# Patient Record
Sex: FEMALE | Race: WHITE | NOT HISPANIC OR LATINO | URBAN - METROPOLITAN AREA
[De-identification: names, ages, dates, MRNs, and addresses within clinical notes are randomized per-mention and may not be internally consistent; named-entity substitution may affect disease eponyms.]

---

## 2023-01-31 DIAGNOSIS — M25.511 RIGHT SHOULDER PAIN, UNSPECIFIED CHRONICITY: Primary | ICD-10-CM

## 2023-02-02 ENCOUNTER — HOSPITAL ENCOUNTER (OUTPATIENT)
Dept: RADIOLOGY | Facility: HOSPITAL | Age: 58
Discharge: HOME | End: 2023-02-02
Attending: ORTHOPAEDIC SURGERY
Payer: COMMERCIAL

## 2023-02-02 ENCOUNTER — OFFICE VISIT (OUTPATIENT)
Dept: ORTHOPEDICS | Facility: CLINIC | Age: 58
End: 2023-02-02
Payer: COMMERCIAL

## 2023-02-02 VITALS
DIASTOLIC BLOOD PRESSURE: 72 MMHG | HEIGHT: 63 IN | BODY MASS INDEX: 26.58 KG/M2 | SYSTOLIC BLOOD PRESSURE: 138 MMHG | WEIGHT: 150 LBS

## 2023-02-02 DIAGNOSIS — M19.011 GLENOHUMERAL ARTHRITIS, RIGHT: ICD-10-CM

## 2023-02-02 DIAGNOSIS — M25.511 RIGHT SHOULDER PAIN, UNSPECIFIED CHRONICITY: ICD-10-CM

## 2023-02-02 DIAGNOSIS — M75.21 BICEPS TENDINITIS OF RIGHT UPPER EXTREMITY: Primary | ICD-10-CM

## 2023-02-02 PROCEDURE — 20550 NJX 1 TENDON SHEATH/LIGAMENT: CPT | Mod: 59,RT,S$GLB, | Performed by: ORTHOPAEDIC SURGERY

## 2023-02-02 PROCEDURE — 20611 LARGE JOINT ASPIRATION/INJECTION: R GLENOHUMERAL: ICD-10-PCS | Mod: RT,S$GLB,, | Performed by: ORTHOPAEDIC SURGERY

## 2023-02-02 PROCEDURE — 73030 X-RAY EXAM OF SHOULDER: CPT | Mod: 26,RT,, | Performed by: RADIOLOGY

## 2023-02-02 PROCEDURE — 99204 OFFICE O/P NEW MOD 45 MIN: CPT | Mod: 25,S$GLB,, | Performed by: ORTHOPAEDIC SURGERY

## 2023-02-02 PROCEDURE — 73030 X-RAY EXAM OF SHOULDER: CPT | Mod: TC,RT

## 2023-02-02 PROCEDURE — 73030 XR SHOULDER COMPLETE 2 OR MORE VIEWS RIGHT: ICD-10-PCS | Mod: 26,RT,, | Performed by: RADIOLOGY

## 2023-02-02 PROCEDURE — 99999 PR PBB SHADOW E&M-EST. PATIENT-LVL III: ICD-10-PCS | Mod: PBBFAC,,, | Performed by: ORTHOPAEDIC SURGERY

## 2023-02-02 PROCEDURE — 20611 DRAIN/INJ JOINT/BURSA W/US: CPT | Mod: RT,S$GLB,, | Performed by: ORTHOPAEDIC SURGERY

## 2023-02-02 PROCEDURE — 99204 PR OFFICE/OUTPT VISIT, NEW, LEVL IV, 45-59 MIN: ICD-10-PCS | Mod: 25,S$GLB,, | Performed by: ORTHOPAEDIC SURGERY

## 2023-02-02 PROCEDURE — 99999 PR PBB SHADOW E&M-EST. PATIENT-LVL III: CPT | Mod: PBBFAC,,, | Performed by: ORTHOPAEDIC SURGERY

## 2023-02-02 PROCEDURE — 20550 TENDON SHEATH: ICD-10-PCS | Mod: 59,RT,S$GLB, | Performed by: ORTHOPAEDIC SURGERY

## 2023-02-02 RX ORDER — NABUMETONE 500 MG/1
500 TABLET, FILM COATED ORAL 2 TIMES DAILY
COMMUNITY
Start: 2022-12-28

## 2023-02-02 RX ORDER — LATANOPROST 50 UG/ML
1 SOLUTION/ DROPS OPHTHALMIC NIGHTLY
COMMUNITY
Start: 2022-12-03

## 2023-02-02 RX ADMIN — METHYLPREDNISOLONE ACETATE 80 MG: 80 INJECTION, SUSPENSION INTRA-ARTICULAR; INTRALESIONAL; INTRAMUSCULAR; SOFT TISSUE at 01:02

## 2023-02-02 RX ADMIN — METHYLPREDNISOLONE ACETATE 40 MG: 80 INJECTION, SUSPENSION INTRA-ARTICULAR; INTRALESIONAL; INTRAMUSCULAR; SOFT TISSUE at 01:02

## 2023-02-02 NOTE — PROGRESS NOTES
Patient ID: Pallavi Hutchison  YOB: 1965  MRN: [id number]    Chief Complaint: Pain of the Right Shoulder      Referred By: Jannette Smiley/ [facility]    History of Present Illness: Pallavi Hutchison is a right handed 57 y.o. female    with a chief complaint of Pain of the Right Shoulder    Pallavi is here today with complaint of right shoulder pain. She rates her pain as a 5/10 on average with tylenol and OTC NSAIDs prn for pain. Her pain began in November 2022 without distinct injury mechanism. She is a  with 3 local teams and says her pain was more noticeable after playing one day in November. She saw Jannette Smiley for dry needling, PT, and strength training. She has no prior hx of any CSI or sx for her shoulder. She reports limited ROM, weakness, and stiffness. She denies any numbness or swelling. Her pain is worsened by trying to reach backwards and raising her arm above shoulder level. She would like to be at 100% for tennis, and has made great progress with physical therapy.     HPI    Past Medical History:   History reviewed. No pertinent past medical history.  Past Surgical History:   Procedure Laterality Date    BACK SURGERY      CHOLECYSTECTOMY      HYSTERECTOMY      TONSILLECTOMY       Family History   Problem Relation Age of Onset    Rheumatologic disease Mother     Cancer Maternal Grandmother      Social History     Socioeconomic History    Marital status:    Tobacco Use    Smoking status: Every Day     Types: Cigarettes    Smokeless tobacco: Never   Substance and Sexual Activity    Alcohol use: Yes    Drug use: Never     Medication List with Changes/Refills   Current Medications    LATANOPROST 0.005 % OPHTHALMIC SOLUTION    Place 1 drop into both eyes every evening.    NABUMETONE (RELAFEN) 500 MG TABLET    Take 500 mg by mouth 2 (two) times daily.     Review of patient's allergies indicates:  No Known Allergies  ROS    Physical Exam:   Body mass  index is 26.57 kg/m².  Vitals:    02/02/23 1326   BP: 138/72      GENERAL: Well appearing, appropriate for stated age, no acute distress.  CARDIOVASCULAR: Pulses regular by peripheral palpation.  PULMONARY: Respirations are even and non-labored.  NEURO: Awake, alert, and oriented x 3.  PSYCH: Mood & affect are appropriate.  HEENT: Head is normocephalic and atraumatic.          Right Shoulder Exam     Tenderness   The patient is tender to palpation of the biceps tendon.    Range of Motion   Active abduction:  160   Forward Elevation: 170  External Rotation 0 degrees:  40   Internal rotation 0 degrees:  Lumbar     Tests & Signs   Cross arm: positive  Drop arm: negative  Impingement: negative  Speed's Test: positive    Other   Sensation: normal    Comments:  Intact extensor pollicis longus, flexor pollicis longus, finger flexion, finger extension, finger abduction and adduction. Sensation intact to radial, median, ulnar, and axillary nerve distributions. Hand warm and well perfused with capillary refill of less than 2 seconds, and palpable distal radial pulses.      Left Shoulder Exam     Range of Motion   Active abduction:  170   Forward Elevation: 180  External Rotation 0 degrees:  50   Internal rotation 0 degrees:  Mid thoracic        Muscle Strength   Right Upper Extremity   Shoulder Abduction: 5/5   Shoulder Internal Rotation: 5/5   Shoulder External Rotation: 5/5   Left Upper Extremity  Shoulder Abduction: 5/5   Shoulder Internal Rotation: 5/5   Shoulder External Rotation: 5/5     Vascular Exam     Right Pulses      Radial:                    2+      Imaging:    X-ray Shoulder 2 or More Views Right  Narrative: EXAM:  XR SHOULDER COMPLETE 2 OR MORE VIEWS RIGHT    INDICATIONS: Right shoulder pain    COMPARISONS: none    TECHNIQUE: 4 views of the right shoulder    FINDINGS:      BONES: No fractures nor dislocations are present.  There are inferior osteophytes at the glenoid fossa and circumferential osteophytes  involving the humeral head.  Inferior acromioclavicular osteophytes are also present.  Both joints appear preserved.    Right upper ribs and right upper lung are normal.        SOFT TISSUES: No abnormal calcifications        MISC: none  Impression:   Degenerative osteoarthritic changes of both the glenohumeral and the acromioclavicular joints as described    Finalized on: 2/2/2023 12:55 PM By:  Santos Multani MD  [facility]# [id number]      2023-02-02 12:57:16.148    [facility]      Relevant imaging results reviewed and interpreted by me, discussed with the patient and / or family today.     Other Tests:         Patient Instructions   Assessment:  Pallavi Hutchison is a 57 y.o. female    with a chief complaint of Pain of the Right Shoulder    Arthritis of glenohumeral joint  Bicep Tendonitis     Encounter Diagnoses   Name Primary?    Glenohumeral arthritis, right     Biceps tendinitis of right upper extremity Yes      Plan:  US guided GH CSI injection 2/2/80  US guided Biceps Tendon 2/2/40  Continue PT with Jannette at [facility]    Follow-up: 6 weeks or sooner if there are any problems between now and then.    Leave Review:   Google: Leave Google Review  Healthgrades: Leave Healthgrades Review    After Hours Number: [phone]      Provider Note/Medical Decision Making:     I, Annette Quintero, acted as a scribe for Pio Peacock MD for the duration of this office visit.    I discussed worrisome and red flag signs and symptoms with the patient. The patient expressed understanding and agreed to alert me immediately or to go to the emergency room if they experience any of these.   Treatment plan was developed with input from the patient/family, and they expressed understanding and agreement with the plan. All questions were answered today.          Pio Peacock MD  Orthopaedic Surgery & Sports Medicine       Disclaimer: This note was prepared using a voice recognition system and is likely to have sound  alike errors within the text.

## 2023-02-02 NOTE — PROGRESS NOTES
Patient ID: Pallavi Hutchison  YOB: 1965  MRN: [id number]    Chief Complaint: Pain of the Right Shoulder      Referred By: Jannette Smiley/ [facility]    History of Present Illness: Pallavi Hutchison is a right handed 57 y.o. female    with a chief complaint of Pain of the Right Shoulder    Pallavi is here today with complaint of right shoulder pain. She rates her pain as a 5/10 on average with tylenol and OTC NSAIDs prn for pain. Her pain began in November 2022 without distinct injury mechanism. She is a  with 3 local teams and says her pain was more noticeable after playing one day. She saw aJnnette Smiley for dry needling, PT, and strength training. She has no prior hx of any CSI or sx for her shoulder. She reports limited ROM, weakness, and stiffness. She denies any numbness or swelling. Her pain is worsened by trying to reach backwards and raising her arm above shoulder level.     HPI    Past Medical History:   History reviewed. No pertinent past medical history.  Past Surgical History:   Procedure Laterality Date    BACK SURGERY      CHOLECYSTECTOMY      HYSTERECTOMY      TONSILLECTOMY       Family History   Problem Relation Age of Onset    Rheumatologic disease Mother     Cancer Maternal Grandmother      Social History     Socioeconomic History    Marital status:    Tobacco Use    Smoking status: Every Day     Types: Cigarettes    Smokeless tobacco: Never   Substance and Sexual Activity    Alcohol use: Yes    Drug use: Never     Medication List with Changes/Refills   Current Medications    LATANOPROST 0.005 % OPHTHALMIC SOLUTION    Place 1 drop into both eyes every evening.    NABUMETONE (RELAFEN) 500 MG TABLET    Take 500 mg by mouth 2 (two) times daily.     Review of patient's allergies indicates:  No Known Allergies  ROS    Physical Exam:   Body mass index is 26.57 kg/m².  There were no vitals filed for this visit.   GENERAL: Well appearing, appropriate  for stated age, no acute distress.  CARDIOVASCULAR: Pulses regular by peripheral palpation.  PULMONARY: Respirations are even and non-labored.  NEURO: Awake, alert, and oriented x 3.  PSYCH: Mood & affect are appropriate.  HEENT: Head is normocephalic and atraumatic.  Ortho/SPM Exam  ***    Imaging:    X-ray Shoulder 2 or More Views Right  Narrative: EXAM:  XR SHOULDER COMPLETE 2 OR MORE VIEWS RIGHT    INDICATIONS: Right shoulder pain    COMPARISONS: none    TECHNIQUE: 4 views of the right shoulder    FINDINGS:      BONES: No fractures nor dislocations are present.  There are inferior osteophytes at the glenoid fossa and circumferential osteophytes involving the humeral head.  Inferior acromioclavicular osteophytes are also present.  Both joints appear preserved.    Right upper ribs and right upper lung are normal.        SOFT TISSUES: No abnormal calcifications        MISC: none  Impression:   Degenerative osteoarthritic changes of both the glenohumeral and the acromioclavicular joints as described    Finalized on: 2/2/2023 12:55 PM By:  Santos Multani MD  [facility]# [id number]      2023-02-02 12:57:16.148    [facility]    ***  Relevant imaging results reviewed and interpreted by me, discussed with the patient and / or family today. ***    Other Tests:     ***    There are no Patient Instructions on file for this visit.  Provider Note/Medical Decision Making: ***      I discussed worrisome and red flag signs and symptoms with the patient. The patient expressed understanding and agreed to alert me immediately or to go to the emergency room if they experience any of these.   Treatment plan was developed with input from the patient/family, and they expressed understanding and agreement with the plan. All questions were answered today.          Pio Peacock MD  Orthopaedic Surgery & Sports Medicine       Disclaimer: This note was prepared using a voice recognition system and is likely to have sound alike errors within the  text.

## 2023-02-02 NOTE — PATIENT INSTRUCTIONS
Assessment:  Pallavi Hutchison is a 57 y.o. female    with a chief complaint of Pain of the Right Shoulder    Arthritis of glenohumeral joint  Bicep Tendonitis     Encounter Diagnoses   Name Primary?    Glenohumeral arthritis, right     Biceps tendinitis of right upper extremity Yes      Plan:  US guided GH CSI injection 2/2/80  US guided Biceps Tendon 2/2/40  Continue PT with Jannette at [facility]    Follow-up: 6 weeks or sooner if there are any problems between now and then.    Leave Review:   Google: Leave Google Review  Healthgrades: Leave Healthgrades Review    After Hours Number: [phone]

## 2023-02-03 NOTE — PROCEDURES
Tendon Sheath    Date/Time: 2/2/2023 1:00 PM  Performed by: Pio Peacock MD  Authorized by: Pio Peacock MD     Consent Done?:  Yes (Verbal)  Indications:  Pain  Site marked: the procedure site was marked    Timeout: prior to procedure the correct patient, procedure, and site was verified    Prep: patient was prepped and draped in usual sterile fashion      Local anesthesia used?: Yes    Anesthesia:  Local infiltration  Local anesthetic:  Lidocaine 1% without epinephrine, topical anesthetic and bupivacaine 0.5% without epinephrine  Anesthetic total (ml):  4    Location:  Shoulder  Site:  R bicep tendon  Ultrasonic guidance for needle placement?: Yes    Needle size:  20 G  Medications:  40 mg methylPREDNISolone acetate 80 mg/mL  Patient tolerance:  Patient tolerated the procedure well with no immediate complications    Additional Comments: Ultrasound Guided Right Shoulder Biceps Tendon Sheath Injection   2cc 1% lidocaine plain, 2cc 0.5% marcaine plain, 0.5cc 80mg methylprednisolone    Procedure Note:  We discussed the risk and benefits of injections, including pain, infection, bleeding, damage to adjacent structures, risk of reaction to injection. We discussed the steroid/cortisone injections will not heal the problem but mat help decrease inflammation and help with symptoms. We discussed the risk of repeated injections. The patient expressed understanding and wanted to proceed with the injection. We performed a timeout to verify the proper patient, proper procedure, and the proper site. The injection site was prepared in a sterile fashion. The patient tolerated it well and there were no complication. We did discuss with the patient that steroid injections can cause some increase in blood sugar and blood pressure for up to a week after the injection.     ULTRASOUND NOTE: Due to the complex nature of the anatomy, and the need to insure the needle was placed precisely at the desired anatomical location,  this procedure was performed with the assistance of ultrasound guidance. Imaging demonstrating needle trajectory and placement was saved on the ultrasound device for documentation purposes.    Large Joint Aspiration/Injection: R glenohumeral    Date/Time: 2/2/2023 1:00 PM  Performed by: Pio Peacock MD  Authorized by: Pio Peacock MD     Consent Done?:  Yes (Verbal)  Indications:  Diagnostic evaluation and pain  Site marked: the procedure site was marked    Timeout: prior to procedure the correct patient, procedure, and site was verified    Prep: patient was prepped and draped in usual sterile fashion      Local anesthesia used?: Yes    Local anesthetic:  Bupivacaine 0.5% without epinephrine and lidocaine 1% without epinephrine    Details:  Needle Size:  20 G  Ultrasonic Guidance for needle placement?: Yes    Images are saved and documented.  Approach:  Posterior  Location:  Shoulder  Site:  R glenohumeral  Medications:  80 mg methylPREDNISolone acetate 80 mg/mL  Patient tolerance:  Patient tolerated the procedure well with no immediate complications     Ultrasound Guided   2cc 1% lidocaine plain, 2cc 0.5% marcaine plain, 1cc 80mg methylprednisolone    Procedure Note:  We discussed the risk and benefits of injections, including pain, infection, bleeding, damage to adjacent structures, risk of reaction to injection. We discussed the steroid/cortisone injections will not heal the problem but mat help decrease inflammation and help with symptoms. We discussed the risk of repeated injections. The patient expressed understanding and wanted to proceed with the injection. We performed a timeout to verify the proper patient, proper procedure, and the proper site. The injection site was prepared in a sterile fashion. The patient tolerated it well and there were no complication. We did discuss with the patient that steroid injections can cause some increase in blood sugar and blood pressure for up to a week after  the injection.     ULTRASOUND NOTE: Due to the complex nature of the anatomy, and the need to insure the needle was placed precisely at the desired anatomical location, this procedure was performed with the assistance of ultrasound guidance. Imaging demonstrating needle trajectory and placement was saved on the ultrasound device for documentation purposes.

## 2023-02-07 RX ORDER — METHYLPREDNISOLONE ACETATE 80 MG/ML
80 INJECTION, SUSPENSION INTRA-ARTICULAR; INTRALESIONAL; INTRAMUSCULAR; SOFT TISSUE
Status: DISCONTINUED | OUTPATIENT
Start: 2023-02-02 | End: 2023-02-07 | Stop reason: HOSPADM

## 2023-02-07 RX ORDER — METHYLPREDNISOLONE ACETATE 80 MG/ML
40 INJECTION, SUSPENSION INTRA-ARTICULAR; INTRALESIONAL; INTRAMUSCULAR; SOFT TISSUE
Status: DISCONTINUED | OUTPATIENT
Start: 2023-02-02 | End: 2023-02-07 | Stop reason: HOSPADM

## 2023-03-15 ENCOUNTER — OFFICE VISIT (OUTPATIENT)
Dept: ORTHOPEDICS | Facility: CLINIC | Age: 58
End: 2023-03-15
Payer: COMMERCIAL

## 2023-03-15 DIAGNOSIS — M75.21 BICEPS TENDINITIS OF RIGHT UPPER EXTREMITY: ICD-10-CM

## 2023-03-15 DIAGNOSIS — M19.011 GLENOHUMERAL ARTHRITIS, RIGHT: ICD-10-CM

## 2023-03-15 DIAGNOSIS — G89.29 CHRONIC RIGHT SHOULDER PAIN: Primary | ICD-10-CM

## 2023-03-15 DIAGNOSIS — M25.511 CHRONIC RIGHT SHOULDER PAIN: Primary | ICD-10-CM

## 2023-03-15 PROCEDURE — 99214 PR OFFICE/OUTPT VISIT, EST, LEVL IV, 30-39 MIN: ICD-10-PCS | Mod: S$GLB,,, | Performed by: ORTHOPAEDIC SURGERY

## 2023-03-15 PROCEDURE — 99214 OFFICE O/P EST MOD 30 MIN: CPT | Mod: S$GLB,,, | Performed by: ORTHOPAEDIC SURGERY

## 2023-03-15 PROCEDURE — 99999 PR PBB SHADOW E&M-EST. PATIENT-LVL III: CPT | Mod: PBBFAC,,, | Performed by: ORTHOPAEDIC SURGERY

## 2023-03-15 PROCEDURE — 99999 PR PBB SHADOW E&M-EST. PATIENT-LVL III: ICD-10-PCS | Mod: PBBFAC,,, | Performed by: ORTHOPAEDIC SURGERY

## 2023-03-15 NOTE — PROGRESS NOTES
Patient ID: Pallavi Hutchison  YOB: 1965  MRN: [id number]    Chief Complaint: Follow-up (US GH CSI and biceps tendon CSI)      Referred By: Jannette Smiley / [facility]    History of Present Illness: Pallavi Hutchison is a right handed 57 y.o. female    with a chief complaint of Follow-up (US GH CSI and biceps tendon CSI)    Patient presents to follow up visit for right shoulder pain s/p US GH CSI and biceps tendon CSI. She states that she experienced relief immediately following the injection. She is no longer experiencing relief. She describes her pain as intermittent, sharp pain that radiates down her arm. She occasionally has pain into her neck. Her pain is a 0/10 at rest but increases to 7-8/10 with activity.    Previous HPI (2/2/23):  Pallavi is here today with complaint of right shoulder pain. She rates her pain as a 5/10 on average with tylenol and OTC NSAIDs prn for pain. Her pain began in November 2022 without distinct injury mechanism. She is a  with 3 local teams and says her pain was more noticeable after playing one day in November. She saw Jannette Smiley for dry needling, PT, and strength training. She has no prior hx of any CSI or sx for her shoulder. She reports limited ROM, weakness, and stiffness. She denies any numbness or swelling. Her pain is worsened by trying to reach backwards and raising her arm above shoulder level. She would like to be at 100% for tennis, and has made great progress with physical therapy.     HPI    Past Medical History:   History reviewed. No pertinent past medical history.  Past Surgical History:   Procedure Laterality Date    BACK SURGERY      CHOLECYSTECTOMY      HYSTERECTOMY      TONSILLECTOMY       Family History   Problem Relation Age of Onset    Rheumatologic disease Mother     Cancer Maternal Grandmother      Social History     Socioeconomic History    Marital status:    Tobacco Use    Smoking status: Every Day      Types: Cigarettes    Smokeless tobacco: Never   Substance and Sexual Activity    Alcohol use: Yes    Drug use: Never     Medication List with Changes/Refills   Current Medications    LATANOPROST 0.005 % OPHTHALMIC SOLUTION    Place 1 drop into both eyes every evening.    NABUMETONE (RELAFEN) 500 MG TABLET    Take 500 mg by mouth 2 (two) times daily.     Review of patient's allergies indicates:  No Known Allergies  ROS    Physical Exam:   There is no height or weight on file to calculate BMI.  There were no vitals filed for this visit.   GENERAL: Well appearing, appropriate for stated age, no acute distress.  CARDIOVASCULAR: Pulses regular by peripheral palpation.  PULMONARY: Respirations are even and non-labored.  NEURO: Awake, alert, and oriented x 3.  PSYCH: Mood & affect are appropriate.  HEENT: Head is normocephalic and atraumatic.          Right Shoulder Exam     Tenderness   The patient is tender to palpation of the biceps tendon.    Tests & Signs   Goff test: positive  Speed's Test: positive    Other   Sensation: normal    Muscle Strength   Right Upper Extremity   Shoulder Abduction: 5/5   Shoulder Internal Rotation: 5/5   Shoulder External Rotation: 5/5     Vascular Exam     Right Pulses      Radial:                    2+      Imaging:    X-ray Shoulder 2 or More Views Right  Narrative: EXAM:  XR SHOULDER COMPLETE 2 OR MORE VIEWS RIGHT    INDICATIONS: Right shoulder pain    COMPARISONS: none    TECHNIQUE: 4 views of the right shoulder    FINDINGS:      BONES: No fractures nor dislocations are present.  There are inferior osteophytes at the glenoid fossa and circumferential osteophytes involving the humeral head.  Inferior acromioclavicular osteophytes are also present.  Both joints appear preserved.    Right upper ribs and right upper lung are normal.        SOFT TISSUES: No abnormal calcifications        MISC: none  Impression:   Degenerative osteoarthritic changes of both the glenohumeral and the  acromioclavicular joints as described    Finalized on: 2/2/2023 12:55 PM By:  Santos Multani MD  [facility]# [id number]      2023-02-02 12:57:16.148    [facility]      Relevant imaging results reviewed and interpreted by me, discussed with the patient and / or family today.     Other Tests:         Patient Instructions   Assessment:  Pallavi Hutchison is a RHD 57 y.o. female    with a chief complaint of Follow-up (US GH CSI and biceps tendon CSI)    Right shoulder glenohumeral arthritis   Right shoulder bicep tendonitis  Minimal to no improvement after CSI and physical therapy     Encounter Diagnoses   Name Primary?    Chronic right shoulder pain Yes    Glenohumeral arthritis, right     Biceps tendinitis of right upper extremity       Plan:  Continue at home exercise provided by Jannette guevara [facility]  MRI of right shoulder   Discussed possible nerve burning procedure with Dr. Mckeon in [facility]    Follow-up: AFTER MRI or sooner if there are any problems between now and then.    Leave Review:   Google: Leave Google Review  Healthgrades: Leave Healthgrades Review    After Hours Number: [phone]       Provider Note/Medical Decision Making:       I discussed worrisome and red flag signs and symptoms with the patient. The patient expressed understanding and agreed to alert me immediately or to go to the emergency room if they experience any of these.   Treatment plan was developed with input from the patient/family, and they expressed understanding and agreement with the plan. All questions were answered today.          Pio Peacock MD  Orthopaedic Surgery & Sports Medicine       Disclaimer: This note was prepared using a voice recognition system and is likely to have sound alike errors within the text.

## 2023-03-15 NOTE — PROGRESS NOTES
Patient ID: Pallavi Hutchison  YOB: 1965  MRN: [id number]    Chief Complaint: No chief complaint on file.      Referred By: Jannette Smiley / [facility]    History of Present Illness: Pallavi Hutchison is a right handed 57 y.o. female    with a chief complaint of No chief complaint on file.    Patient presents to follow up visit for right shoulder pain s/p US GH CSI and biceps tendon CSI. She states that she experienced relief immediately following the injection. She is no longer experiencing relief. She describes her pain as intermittent, sharp pain that radiates down her arm. She occasionally has pain into her neck. Her pain is a 0/10 at rest but increases to 7-8/10 with activity.    Previous HPI (2/2/23):  Pallavi is here today with complaint of right shoulder pain. She rates her pain as a 5/10 on average with tylenol and OTC NSAIDs prn for pain. Her pain began in November 2022 without distinct injury mechanism. She is a  with 3 local teams and says her pain was more noticeable after playing one day in November. She saw Jannette Smiley for dry needling, PT, and strength training. She has no prior hx of any CSI or sx for her shoulder. She reports limited ROM, weakness, and stiffness. She denies any numbness or swelling. Her pain is worsened by trying to reach backwards and raising her arm above shoulder level. She would like to be at 100% for tennis, and has made great progress with physical therapy.     HPI    Past Medical History:   No past medical history on file.  Past Surgical History:   Procedure Laterality Date    BACK SURGERY      CHOLECYSTECTOMY      HYSTERECTOMY      TONSILLECTOMY       Family History   Problem Relation Age of Onset    Rheumatologic disease Mother     Cancer Maternal Grandmother      Social History     Socioeconomic History    Marital status:    Tobacco Use    Smoking status: Every Day     Types: Cigarettes    Smokeless tobacco: Never    Substance and Sexual Activity    Alcohol use: Yes    Drug use: Never     Medication List with Changes/Refills   Current Medications    LATANOPROST 0.005 % OPHTHALMIC SOLUTION    Place 1 drop into both eyes every evening.    NABUMETONE (RELAFEN) 500 MG TABLET    Take 500 mg by mouth 2 (two) times daily.     Review of patient's allergies indicates:  No Known Allergies  ROS    Physical Exam:   There is no height or weight on file to calculate BMI.  There were no vitals filed for this visit.   GENERAL: Well appearing, appropriate for stated age, no acute distress.  CARDIOVASCULAR: Pulses regular by peripheral palpation.  PULMONARY: Respirations are even and non-labored.  NEURO: Awake, alert, and oriented x 3.  PSYCH: Mood & affect are appropriate.  HEENT: Head is normocephalic and atraumatic.  Ortho/SPM Exam  ***    Imaging:    X-ray Shoulder 2 or More Views Right  Narrative: EXAM:  XR SHOULDER COMPLETE 2 OR MORE VIEWS RIGHT    INDICATIONS: Right shoulder pain    COMPARISONS: none    TECHNIQUE: 4 views of the right shoulder    FINDINGS:      BONES: No fractures nor dislocations are present.  There are inferior osteophytes at the glenoid fossa and circumferential osteophytes involving the humeral head.  Inferior acromioclavicular osteophytes are also present.  Both joints appear preserved.    Right upper ribs and right upper lung are normal.        SOFT TISSUES: No abnormal calcifications        MISC: none  Impression:   Degenerative osteoarthritic changes of both the glenohumeral and the acromioclavicular joints as described    Finalized on: 2/2/2023 12:55 PM By:  Santos Multani MD  [facility]# [id number]      2023-02-02 12:57:16.148    [facility]    ***  Relevant imaging results reviewed and interpreted by me, discussed with the patient and / or family today. ***    Other Tests:     ***    There are no Patient Instructions on file for this visit.  Provider Note/Medical Decision Making: ***      I discussed worrisome and red flag  signs and symptoms with the patient. The patient expressed understanding and agreed to alert me immediately or to go to the emergency room if they experience any of these.   Treatment plan was developed with input from the patient/family, and they expressed understanding and agreement with the plan. All questions were answered today.          Pio Peacock MD  Orthopaedic Surgery & Sports Medicine       Disclaimer: This note was prepared using a voice recognition system and is likely to have sound alike errors within the text.

## 2023-03-15 NOTE — PATIENT INSTRUCTIONS
Assessment:  Pallavi Hutchison is a RHD 57 y.o. female    with a chief complaint of Follow-up (US GH CSI and biceps tendon CSI)    Right shoulder glenohumeral arthritis   Right shoulder bicep tendonitis  Minimal to no improvement after CSI and physical therapy     Encounter Diagnoses   Name Primary?    Chronic right shoulder pain Yes    Glenohumeral arthritis, right     Biceps tendinitis of right upper extremity       Plan:  Continue at home exercise provided by Jannette at [facility]  MRI of right shoulder   Discussed possible nerve burning procedure with Dr. Mckeon in IPM    Follow-up: AFTER MRI or sooner if there are any problems between now and then.    Leave Review:   Google: Leave Google Review  Healthgrades: Leave Healthgrades Review    After Hours Number: [phone]

## 2023-03-22 ENCOUNTER — TELEPHONE (OUTPATIENT)
Dept: ORTHOPEDICS | Facility: CLINIC | Age: 58
End: 2023-03-22
Payer: COMMERCIAL

## 2023-03-23 ENCOUNTER — HOSPITAL ENCOUNTER (OUTPATIENT)
Dept: RADIOLOGY | Facility: HOSPITAL | Age: 58
Discharge: HOME | End: 2023-03-23
Attending: ORTHOPAEDIC SURGERY
Payer: COMMERCIAL

## 2023-03-23 DIAGNOSIS — G89.29 CHRONIC RIGHT SHOULDER PAIN: ICD-10-CM

## 2023-03-23 DIAGNOSIS — M25.511 CHRONIC RIGHT SHOULDER PAIN: ICD-10-CM

## 2023-03-23 PROCEDURE — 73221 MRI SHOULDER WITHOUT CONTRAST RIGHT: ICD-10-PCS | Mod: 26,RT,, | Performed by: STUDENT IN AN ORGANIZED HEALTH CARE EDUCATION/TRAINING PROGRAM

## 2023-03-23 PROCEDURE — 73221 MRI JOINT UPR EXTREM W/O DYE: CPT | Mod: 26,RT,, | Performed by: STUDENT IN AN ORGANIZED HEALTH CARE EDUCATION/TRAINING PROGRAM

## 2023-03-23 PROCEDURE — 73221 MRI JOINT UPR EXTREM W/O DYE: CPT | Mod: TC,PN,RT

## 2023-03-24 ENCOUNTER — OFFICE VISIT (OUTPATIENT)
Dept: ORTHOPEDICS | Facility: CLINIC | Age: 58
End: 2023-03-24
Payer: COMMERCIAL

## 2023-03-24 DIAGNOSIS — M19.011 GLENOHUMERAL ARTHRITIS, RIGHT: ICD-10-CM

## 2023-03-24 DIAGNOSIS — M75.81 TENDINITIS OF RIGHT ROTATOR CUFF: Primary | ICD-10-CM

## 2023-03-24 DIAGNOSIS — M19.011 ARTHRITIS OF RIGHT ACROMIOCLAVICULAR JOINT: ICD-10-CM

## 2023-03-24 PROCEDURE — 99999 PR PBB SHADOW E&M-EST. PATIENT-LVL III: ICD-10-PCS | Mod: PBBFAC,,, | Performed by: ORTHOPAEDIC SURGERY

## 2023-03-24 PROCEDURE — 99214 OFFICE O/P EST MOD 30 MIN: CPT | Mod: S$GLB,,, | Performed by: ORTHOPAEDIC SURGERY

## 2023-03-24 PROCEDURE — 99999 PR PBB SHADOW E&M-EST. PATIENT-LVL III: CPT | Mod: PBBFAC,,, | Performed by: ORTHOPAEDIC SURGERY

## 2023-03-24 PROCEDURE — 99214 PR OFFICE/OUTPT VISIT, EST, LEVL IV, 30-39 MIN: ICD-10-PCS | Mod: S$GLB,,, | Performed by: ORTHOPAEDIC SURGERY

## 2023-03-24 NOTE — PATIENT INSTRUCTIONS
Assessment:  Pallavi VELEZ Kianna is a RHD 57 y.o. female    with a chief complaint of Pain of the Right Shoulder    R shoulder OA  R shoulder rotator cuff tendonitis    Encounter Diagnoses   Name Primary?    Tendinitis of right rotator cuff Yes    Glenohumeral arthritis, right     Arthritis of right acromioclavicular joint       Plan:  Start PT- [facility] or at the [facility] with Marcellus    Follow-up: 6 weeks or sooner if there are any problems between now and then.    Leave Review:   Google: Leave Google Review  Healthgrades: Leave Healthgrades Review    After Hours Number: [phone]

## 2023-03-24 NOTE — PROGRESS NOTES
Patient ID: Pallavi Hutchison  YOB: 1965  MRN: [id number]    Chief Complaint: Pain of the Right Shoulder      Referred By: current patient    History of Present Illness: Pallavi Hutchison is a RHD 57 y.o. female    with a chief complaint of Pain of the Right Shoulder    Pallavi is here today for her right shoulder MRI review. She rates her pain as a 7/10 today. She is still doing her home exercise program.     HPI    Past Medical History:   No past medical history on file.  Past Surgical History:   Procedure Laterality Date    BACK SURGERY      CHOLECYSTECTOMY      HYSTERECTOMY      TONSILLECTOMY       Family History   Problem Relation Age of Onset    Rheumatologic disease Mother     Cancer Maternal Grandmother      Social History     Socioeconomic History    Marital status: Single   Tobacco Use    Smoking status: Every Day     Types: Cigarettes    Smokeless tobacco: Never   Substance and Sexual Activity    Alcohol use: Yes    Drug use: Never     Medication List with Changes/Refills   Current Medications    LATANOPROST 0.005 % OPHTHALMIC SOLUTION    Place 1 drop into both eyes every evening.    NABUMETONE (RELAFEN) 500 MG TABLET    Take 500 mg by mouth 2 (two) times daily.     Review of patient's allergies indicates:  No Known Allergies  ROS    Physical Exam:   There is no height or weight on file to calculate BMI.  There were no vitals filed for this visit.   GENERAL: Well appearing, appropriate for stated age, no acute distress.  CARDIOVASCULAR: Pulses regular by peripheral palpation.  PULMONARY: Respirations are even and non-labored.  NEURO: Awake, alert, and oriented x 3.  PSYCH: Mood & affect are appropriate.  HEENT: Head is normocephalic and atraumatic.  Ortho/SPM Exam  Right shoulder: no changes    Imaging:    MRI Shoulder Without Contrast Right  Narrative: EXAMINATION:  MRI SHOULDER WITHOUT CONTRAST RIGHT    CLINICAL HISTORY:  Shoulder pain, chronic, osteoarthritis suspected;   Pain in right shoulder    TECHNIQUE:  Multiplanar, multisequence imaging of the right shoulder without the use of intravenous contrast.    COMPARISON:  Right shoulder radiographs 02/02/2023.    FINDINGS:  Rotator cuff:  Moderate rotator cuff tendinosis without a full-thickness or significant partial-thickness tear.  Relative sparing of the teres minor tendon.  Moderate interstitial tearing along the infraspinatus musculotendinous junction.  Small focal interstitial tear is at the anterior supraspinatus and posterior infraspinatus insertions.  Muscle bulk is preserved.    Labrum: Diffuse degenerative labral fraying with possible degenerative tearing along the posterior and inferior labrum, approximately 180°.  No definite paralabral cyst.    Biceps: Mild intra-articular biceps tendinosis.  Mild tenosynovitis.    Bone: No acute fracture or suspicious osseous lesion.    Glenohumeral joint: Anatomical joint alignment.  Moderate/severe cartilage thinning and irregularity with near complete cartilage loss at the superior/medial humeral head and mild associated subchondral edema.  Mild inferior humeral head marginal osteophytosis.  Small effusion with moderate synovitis.  No visualized intra-articular body.    AC joint: Moderate AC joint degenerative arthrosis with marginal osteophytosis contributes to moderate encroachment on the rotator cuff.  Type 2 acromial arch with lateral downsloping.  No os acromial.    Miscellaneous: Mild subacromial/subdeltoid bursitis.  Impression: Moderate rotator cuff tendinosis without a full-thickness or significant partial-thickness tear.  Mild/moderate interstitial tearing, as above.    Mild subacromial/subdeltoid bursitis.  Mild intra-articular biceps tendinosis with tenosynovitis.    Diffuse degenerative labral fraying with possible 180 degree degenerative tear involving the posterior and inferior labrum.    Moderate/severe glenohumeral chondrosis with mild/moderate osseous degenerative  changes.  Small effusion with moderate synovitis.  No intra-articular body.    Moderate AC joint degenerative arthrosis contributes to moderate encroachment on the rotator cuff.    Electronically signed by: Angel Luis Silva  Date:    03/23/2023  Time:    12:53      Relevant imaging results reviewed and interpreted by me, discussed with the patient and / or family today.     Other Tests:         Patient Instructions   Assessment:  Pallavi Hutchison is a RHD 57 y.o. female    with a chief complaint of Pain of the Right Shoulder    R shoulder OA  R shoulder rotator cuff tendonitis    Encounter Diagnoses   Name Primary?    Tendinitis of right rotator cuff Yes    Glenohumeral arthritis, right     Arthritis of right acromioclavicular joint       Plan:  Start PT- [facility] or at the [facility] with Marcellus    Follow-up: 6 weeks or sooner if there are any problems between now and then.    Leave Review:   Google: Leave Google Review  Healthgrades: Leave Healthgrades Review    After Hours Number: [phone]      Provider Note/Medical Decision Making:       I discussed worrisome and red flag signs and symptoms with the patient. The patient expressed understanding and agreed to alert me immediately or to go to the emergency room if they experience any of these.   Treatment plan was developed with input from the patient/family, and they expressed understanding and agreement with the plan. All questions were answered today.          Pio Peacock MD  Orthopaedic Surgery & Sports Medicine       Disclaimer: This note was prepared using a voice recognition system and is likely to have sound alike errors within the text.

## 2023-05-01 ENCOUNTER — PATIENT MESSAGE (OUTPATIENT)
Dept: SPORTS MEDICINE | Facility: CLINIC | Age: 58
End: 2023-05-01
Payer: COMMERCIAL

## 2023-05-03 ENCOUNTER — TELEPHONE (OUTPATIENT)
Dept: SPORTS MEDICINE | Facility: CLINIC | Age: 58
End: 2023-05-03
Payer: COMMERCIAL

## 2023-05-03 NOTE — TELEPHONE ENCOUNTER
Spoke with patient about rescheduling appointment on 5/5/23.  Patient stated that she would like to cancel the appointment because she has shown great improvement at PT with [facility].  Told her to reach out to us if she needs anything.  She was grateful for the call.

## 2024-07-30 ENCOUNTER — HOSPITAL ENCOUNTER (OUTPATIENT)
Dept: PREADMISSION TESTING | Facility: HOSPITAL | Age: 59
Discharge: HOME | End: 2024-07-30
Attending: OBSTETRICS & GYNECOLOGY
Payer: COMMERCIAL

## 2024-07-30 DIAGNOSIS — Z12.11 SCREEN FOR COLON CANCER: Primary | ICD-10-CM

## 2024-07-30 RX ORDER — SODIUM, POTASSIUM,MAG SULFATES 17.5-3.13G
1 SOLUTION, RECONSTITUTED, ORAL ORAL DAILY
Qty: 1 KIT | Refills: 0 | Status: SHIPPED | OUTPATIENT
Start: 2024-07-30 | End: 2024-08-01

## 2024-11-29 ENCOUNTER — TELEPHONE (OUTPATIENT)
Dept: ORTHOPEDICS | Facility: CLINIC | Age: 59
End: 2024-11-29
Payer: COMMERCIAL

## 2024-11-29 DIAGNOSIS — S83.242D ACUTE MEDIAL MENISCUS TEAR OF LEFT KNEE, SUBSEQUENT ENCOUNTER: Primary | ICD-10-CM

## 2024-11-29 RX ORDER — NABUMETONE 500 MG/1
500 TABLET, FILM COATED ORAL 2 TIMES DAILY
Qty: 60 TABLET | Refills: 2 | Status: SHIPPED | OUTPATIENT
Start: 2024-11-29

## 2024-11-29 NOTE — TELEPHONE ENCOUNTER
----- Message from Kyung sent at 11/27/2024  1:43 PM CST -----  Contact: c  Type:  RX Refill Request    Who Called: amy  Refill or New Rx:refill  RX Name and Strength:nabumetone   How is the patient currently taking it? (ex. 1XDay):unknown  Is this a 30 day or 90 day RX:  Local or Mail Order:local  Ordering Provider:constantine tello  Would the patient rather a call back or a response via [facility]? Call back   Best Call Back Number: [phone]  Additional Information:   [id number] - [place] - [address]  [address] [address]  Phone: [phone] Fax: [phone]

## 2025-03-14 ENCOUNTER — TELEPHONE (OUTPATIENT)
Dept: ORTHOPEDICS | Facility: CLINIC | Age: 60
End: 2025-03-14
Payer: COMMERCIAL

## 2025-03-14 DIAGNOSIS — S83.242D ACUTE MEDIAL MENISCUS TEAR OF LEFT KNEE, SUBSEQUENT ENCOUNTER: ICD-10-CM

## 2025-03-14 RX ORDER — NABUMETONE 500 MG/1
500 TABLET, FILM COATED ORAL 2 TIMES DAILY PRN
Qty: 60 TABLET | Refills: 0 | Status: SHIPPED | OUTPATIENT
Start: 2025-03-14

## 2025-03-14 NOTE — TELEPHONE ENCOUNTER
----- Message from Marcellus Andino PA-C sent at 3/14/2025 12:30 PM CDT -----  May provide one month refill. Patient needs to be seen for further refills.  ----- Message -----  From: Shelby Guzman MA  Sent: 3/14/2025  12:21 PM CDT  To: Marcellus Andino PA-C    Requesting refill of Nabumetone  ----- Message -----  From: Samantha Myers  Sent: 3/14/2025  11:38 AM CDT  To: Pope Kimani Staff    Type:  RX Refill RequestWho Called:  pt Refill or New Rx:  refill RX Name and Strength:  nabumetone (RELAFEN) 500 MG tabletHow is the patient currently taking it? (ex. 1XDay):  as directIs this a 30 day or 90 day RX:  90Preferred Pharmacy with phone number:  [id number] - [place], [place] - [address] [facility] [id number] [facility] [address]Gjavc: [phone] Fax: [phone] or Mail Order:  local Ordering Provider:  pope Flaquito Call Back Number:  [phone] (home) [phone] (work)Additional Information:  please advise

## 2025-03-14 NOTE — TELEPHONE ENCOUNTER
Per Marcellus he will approve 1 refill spoke with patient about coming in for an office visit for additional refills. Patient does not understand why she should have to come in for refills. I will ask Marcellus and call her back.

## 2025-03-21 ENCOUNTER — HOSPITAL ENCOUNTER (OUTPATIENT)
Dept: RADIOLOGY | Facility: HOSPITAL | Age: 60
Discharge: HOME | End: 2025-03-21
Attending: ORTHOPAEDIC SURGERY
Payer: COMMERCIAL

## 2025-03-21 ENCOUNTER — HOSPITAL ENCOUNTER (OUTPATIENT)
Dept: RADIOLOGY | Facility: HOSPITAL | Age: 60
Discharge: HOME | End: 2025-03-21
Attending: PHYSICIAN ASSISTANT
Payer: COMMERCIAL

## 2025-03-21 ENCOUNTER — OFFICE VISIT (OUTPATIENT)
Dept: ORTHOPEDICS | Facility: CLINIC | Age: 60
End: 2025-03-21
Payer: COMMERCIAL

## 2025-03-21 ENCOUNTER — PATIENT MESSAGE (OUTPATIENT)
Dept: ORTHOPEDICS | Facility: CLINIC | Age: 60
End: 2025-03-21

## 2025-03-21 ENCOUNTER — OFFICE VISIT (OUTPATIENT)
Dept: SPORTS MEDICINE | Facility: CLINIC | Age: 60
End: 2025-03-21
Payer: COMMERCIAL

## 2025-03-21 VITALS
HEART RATE: 61 BPM | DIASTOLIC BLOOD PRESSURE: 73 MMHG | BODY MASS INDEX: 23.9 KG/M2 | SYSTOLIC BLOOD PRESSURE: 110 MMHG | WEIGHT: 140 LBS | HEIGHT: 64 IN

## 2025-03-21 VITALS — WEIGHT: 140 LBS | BODY MASS INDEX: 24.03 KG/M2

## 2025-03-21 DIAGNOSIS — M25.561 RIGHT KNEE PAIN, UNSPECIFIED CHRONICITY: ICD-10-CM

## 2025-03-21 DIAGNOSIS — M25.561 ACUTE PAIN OF RIGHT KNEE: ICD-10-CM

## 2025-03-21 DIAGNOSIS — M25.561 ACUTE PAIN OF RIGHT KNEE: Primary | ICD-10-CM

## 2025-03-21 DIAGNOSIS — M25.561 RIGHT KNEE PAIN, UNSPECIFIED CHRONICITY: Primary | ICD-10-CM

## 2025-03-21 DIAGNOSIS — S83.281A ACUTE LATERAL MENISCUS TEAR OF RIGHT KNEE, INITIAL ENCOUNTER: ICD-10-CM

## 2025-03-21 DIAGNOSIS — M17.11 PRIMARY OSTEOARTHRITIS OF RIGHT KNEE: Primary | ICD-10-CM

## 2025-03-21 DIAGNOSIS — Z53.9 PERSONS ENCOUNTERING HEALTH SERVICES FOR SPECIFIC PROCEDURES, NOT CARRIED OUT: Primary | ICD-10-CM

## 2025-03-21 DIAGNOSIS — M17.12 PRIMARY OSTEOARTHRITIS OF LEFT KNEE: ICD-10-CM

## 2025-03-21 PROCEDURE — 73564 X-RAY EXAM KNEE 4 OR MORE: CPT | Mod: 26,RT,, | Performed by: RADIOLOGY

## 2025-03-21 PROCEDURE — 73721 MRI JNT OF LWR EXTRE W/O DYE: CPT | Mod: TC,RT

## 2025-03-21 PROCEDURE — 99999 PR PBB SHADOW E&M-EST. PATIENT-LVL III: CPT | Mod: PBBFAC,,, | Performed by: ORTHOPAEDIC SURGERY

## 2025-03-21 PROCEDURE — 73721 MRI JNT OF LWR EXTRE W/O DYE: CPT | Mod: 26,RT,, | Performed by: STUDENT IN AN ORGANIZED HEALTH CARE EDUCATION/TRAINING PROGRAM

## 2025-03-21 PROCEDURE — 99499 UNLISTED E&M SERVICE: CPT | Mod: S$GLB,,, | Performed by: PHYSICIAN ASSISTANT

## 2025-03-21 PROCEDURE — 73562 X-RAY EXAM OF KNEE 3: CPT | Mod: 26,59,LT, | Performed by: RADIOLOGY

## 2025-03-21 PROCEDURE — 73564 X-RAY EXAM KNEE 4 OR MORE: CPT | Mod: TC,RT

## 2025-03-21 RX ORDER — BRIMONIDINE TARTRATE 2 MG/ML
1 SOLUTION/ DROPS OPHTHALMIC 2 TIMES DAILY
COMMUNITY

## 2025-03-21 NOTE — PATIENT INSTRUCTIONS
Encounter Diagnoses   Name Primary?    Primary osteoarthritis of right knee Yes    Primary osteoarthritis of left knee     Acute lateral meniscus tear of right knee, initial encounter        ASSESSMENT:  Acute twisting injury right knee, high suspicion for lateral meniscal tear  The patient's knee does not fully extended as swollen and has a positive Alexandra along the lateral joint line    TREATMENT/PLAN:  MRI indicated to evaluate for acute meniscal tear of the right knee.  Evaluate results of the MRI after images obtained and I will discuss with the patient treatment options.  The patient states she would be ready for arthroscopic surgery in the very near future.  She underwent left knee arthroscopy last year after a similar meniscal injury.  She has done fine with her left knee.

## 2025-03-21 NOTE — PROGRESS NOTES
Kimani Bond MD  Orthopedic Surgeon   [address] [place] [address]                 Name: Pallavi Hutchison  MRN: [id number]  Date: 3/21/2025    Patient ID: Pallavi Hutchison is a 59 y.o. female from [place]    Reason for visit:  Right knee acute injury yesterday playing tennis    Patient Instructions     Encounter Diagnoses   Name Primary?    Primary osteoarthritis of right knee Yes    Primary osteoarthritis of left knee     Acute lateral meniscus tear of right knee, initial encounter        ASSESSMENT:  Acute twisting injury right knee, high suspicion for lateral meniscal tear  The patient's knee does not fully extended as swollen and has a positive Alexandra along the lateral joint line    TREATMENT/PLAN:  MRI indicated to evaluate for acute meniscal tear of the right knee.  Evaluate results of the MRI after images obtained and I will discuss with the patient treatment options.  The patient states she would be ready for arthroscopic surgery in the very near future.  She underwent left knee arthroscopy last year after a similar meniscal injury.  She has done fine with her left knee.    HISTORY OF PRESENT ILLNESS:   Pallavi Hutchison is a 59 y.o. female.Patient presents today pain swelling and inability to fully extend the right knee.  The patient injured herself last night playing tennis.  She had acute pain and inability to continue playing.  She has feels certain she may have torn her meniscus.  She has been limping since last night.  She has been using her normal nabumetone for arthritis treatment and added some Advil and Tylenol.    I performed a left knee arthroscopy August of 2024 on the left knee for a lateral meniscal tear with chondroplasty.  Since that time she has been functioning well and back to  playing tennis regularly.  Her right knee was minimally bothersome with mild arthritic symptoms until the injury she sustained last night.    Objective     XRAY:  Bilateral knees with lateral joint space narrowing with PA standing flexion and lateral osteophytes and sclerosis near bone-on-bone.  Kellgren Dontae grade 3-4 changes bilateral knees    PHYSICAL EXAMINATION: Body mass index is 24.03 kg/m².    GENERAL:  Cooperative pleasant well dressed middle-aged  female.  She is in moderate distress due to pain in the knee and inability to fully straighten the knee with positive swelling.    EXTREMITY:  Positive suprapatellar effusion of the right knee.  Pain along lateral joint line.  Lateral Alexandra produces clicking and popping.  LCL is stable MCL is stable and ACL and PCL are stable.  Patellar tracked centrally.  Calf is soft.  Neurovascularly intact in the right and left lower extremities.         MEDICATIONS: Current Medications[1]    ALLERGIES: Review of patient's allergies indicates:  No Known Allergies    MEDICAL HISTORY:   Past Medical History:   Diagnosis Date    Endometriosis, unspecified        SURGICAL HISTORY:   Past Surgical History:   Procedure Laterality Date    BACK SURGERY      CHOLECYSTECTOMY      HYSTERECTOMY      Robotic with removal of 1 ovary by Dr. Myers for endometriosis    TONSILLECTOMY         REVIEW OF SYSTEMS:   No fevers, chills, sweats, chest pain or shortness of breath.    SOCIAL HISTORY:   Social History     Occupational History    Not on file   Tobacco Use    Smoking status: Every Day     Types: Cigarettes    Smokeless tobacco: Never   Substance and Sexual Activity    Alcohol use: Yes    Drug use: Never    Sexual activity: Not on file       Patient Type: Established Patient  Visit Type: (CPT 23676 - Estab 30-39 min)     Thirty-five minute patient encounter  This includes face to face time and non-face to face time preparing to see the patient (eg, review of tests),    obtaining and/or reviewing separately obtained history, documenting clinical information in the electronic or other health record, independently interpreting results   and communicating results to the patient/family/caregiver, or care coordinator.         DISCLAIMER: This note was prepared with Eveo voice recognition transcription software. Garbled syntax, mangled pronouns, and other bizarre constructions may be attributed to that software system.      Kimani Bond M.D.  Orthopedic Surgeon  [facility]           [1]   Current Outpatient Medications:     brimonidine 0.2% (ALPHAGAN) 0.2 % Drop, Place 1 drop into both eyes 2 (two) times daily., Disp: , Rfl:     latanoprost 0.005 % ophthalmic solution, Place 1 drop into both eyes every evening., Disp: , Rfl:     nabumetone (RELAFEN) 500 MG tablet, Take 1 tablet (500 mg total) by mouth 2 (two) times daily as needed for Pain., Disp: 60 tablet, Rfl: 0

## 2025-03-24 ENCOUNTER — PATIENT MESSAGE (OUTPATIENT)
Dept: PREADMISSION TESTING | Facility: HOSPITAL | Age: 60
End: 2025-03-24
Payer: COMMERCIAL

## 2025-03-24 ENCOUNTER — TELEPHONE (OUTPATIENT)
Dept: ORTHOPEDICS | Facility: CLINIC | Age: 60
End: 2025-03-24
Payer: COMMERCIAL

## 2025-03-24 DIAGNOSIS — S83.281A ACUTE LATERAL MENISCUS TEAR OF RIGHT KNEE, INITIAL ENCOUNTER: ICD-10-CM

## 2025-03-24 DIAGNOSIS — Z01.818 PRE-OP EXAM: Primary | ICD-10-CM

## 2025-03-24 NOTE — TELEPHONE ENCOUNTER
----- Message from Amaya sent at 3/24/2025  3:38 PM CDT -----  Contact: Patient, [phone]  Patient is returning a phone call.Who left a message for the patient: Lisandra patient know what this is regarding:  Medical recordsWould you like a call back, or a response through your [facility] portal?:   CallbackComments: Calling to speak with you again. Please call her. Thanks.

## 2025-03-24 NOTE — TELEPHONE ENCOUNTER
Shelby spoke to Mrs. Hutchison.   She wanted to know why her surgery was going to cost $4000.00.   Shelby gave her the number to billing to speak with them on charges.

## 2025-03-24 NOTE — TELEPHONE ENCOUNTER
Spoke with Patient she does  not want to have surgery due to deductible amount. She will would like her OV notes and MRI report sent over to Dr. Sahu office .

## 2025-03-24 NOTE — TELEPHONE ENCOUNTER
----- Message from [employer] sent at 3/24/2025  2:24 PM CDT -----  Contact: patient  Type:  Patient Returning CallWho Called:Pallavi Hutchison Who Left Message for Patient: Shelby Does the patient know what this is regarding?: surgery Would the patient rather a call back or a response via [facility]?  Call Best Call Back Number: [phone] Additional Information:   [Dear  ___] : Dear  [unfilled], [Consult Letter:] : I had the pleasure of evaluating your patient, [unfilled]. [Please see my note below.] : Please see my note below. [Consult Closing:] : Thank you very much for allowing me to participate in the care of this patient.  If you have any questions, please do not hesitate to contact me. [FreeTextEntry3] : Sincerely,\par  \par  Amy Pillai MD\par  [facility] Physician [facility]\par  Pulmonary Medicine\par  tel: [phone]\par  fax: [phone]\par

## 2025-03-24 NOTE — TELEPHONE ENCOUNTER
----- Message from Ena sent at 3/24/2025  1:26 PM CDT -----  Contact: Pallavi Freeman is calling to receive a call back at .[phone]. Wanting to speak with nurse regarding upcoming surgery.

## 2025-04-12 DIAGNOSIS — S83.242D ACUTE MEDIAL MENISCUS TEAR OF LEFT KNEE, SUBSEQUENT ENCOUNTER: ICD-10-CM

## 2025-04-15 RX ORDER — NABUMETONE 500 MG/1
500 TABLET, FILM COATED ORAL 2 TIMES DAILY PRN
Qty: 60 TABLET | Refills: 0 | Status: SHIPPED | OUTPATIENT
Start: 2025-04-15

## 2025-04-21 DIAGNOSIS — S83.242D ACUTE MEDIAL MENISCUS TEAR OF LEFT KNEE, SUBSEQUENT ENCOUNTER: ICD-10-CM

## 2025-04-22 RX ORDER — NABUMETONE 500 MG/1
500 TABLET, FILM COATED ORAL 2 TIMES DAILY PRN
Qty: 60 TABLET | Refills: 0 | Status: SHIPPED | OUTPATIENT
Start: 2025-04-22